# Patient Record
Sex: FEMALE | URBAN - METROPOLITAN AREA
[De-identification: names, ages, dates, MRNs, and addresses within clinical notes are randomized per-mention and may not be internally consistent; named-entity substitution may affect disease eponyms.]

---

## 2024-03-15 ENCOUNTER — OFFICE VISIT (OUTPATIENT)
Age: 79
End: 2024-03-15

## 2024-03-15 ENCOUNTER — HOSPITAL ENCOUNTER (OUTPATIENT)
Facility: HOSPITAL | Age: 79
Setting detail: SPECIMEN
Discharge: HOME | End: 2024-03-18

## 2024-03-15 VITALS
HEART RATE: 120 BPM | DIASTOLIC BLOOD PRESSURE: 69 MMHG | TEMPERATURE: 98 F | HEIGHT: 67 IN | BODY MASS INDEX: 26.53 KG/M2 | WEIGHT: 169 LBS | OXYGEN SATURATION: 95 % | RESPIRATION RATE: 16 BRPM | SYSTOLIC BLOOD PRESSURE: 111 MMHG

## 2024-03-15 DIAGNOSIS — R22.1 MASS OF LEFT SIDE OF NECK: Primary | ICD-10-CM

## 2024-03-15 DIAGNOSIS — F41.9 ANXIETY: ICD-10-CM

## 2024-03-15 RX ORDER — ALPRAZOLAM 0.25 MG/1
0.25 TABLET ORAL 3 TIMES DAILY PRN
Qty: 20 TABLET | Refills: 0 | Status: SHIPPED | OUTPATIENT
Start: 2024-03-15 | End: 2024-04-14

## 2024-03-15 RX ORDER — BLOOD SUGAR DIAGNOSTIC
STRIP MISCELLANEOUS
COMMUNITY
Start: 2024-03-11

## 2024-03-15 RX ORDER — M-VIT,TX,IRON,MINS/CALC/FOLIC 27MG-0.4MG
1 TABLET ORAL DAILY
COMMUNITY

## 2024-03-15 RX ORDER — ATORVASTATIN CALCIUM 10 MG/1
10 TABLET, FILM COATED ORAL DAILY
COMMUNITY
Start: 2024-01-14

## 2024-03-15 RX ORDER — LISINOPRIL 10 MG/1
10 TABLET ORAL DAILY
COMMUNITY
Start: 2024-01-14

## 2024-03-15 RX ORDER — LANCETS
EACH MISCELLANEOUS
COMMUNITY
Start: 2024-03-13

## 2024-03-15 RX ORDER — DULAGLUTIDE 3 MG/.5ML
3 INJECTION, SOLUTION SUBCUTANEOUS
COMMUNITY
Start: 2024-02-22

## 2024-03-15 RX ORDER — EMPAGLIFLOZIN 10 MG/1
10 TABLET, FILM COATED ORAL DAILY
COMMUNITY
Start: 2024-01-27

## 2024-03-15 ASSESSMENT — ENCOUNTER SYMPTOMS
SHORTNESS OF BREATH: 0
SORE THROAT: 0
EYE PAIN: 0
CONSTIPATION: 0
COUGH: 0
BACK PAIN: 0
VOMITING: 0
DIARRHEA: 0
NAUSEA: 0
ABDOMINAL PAIN: 0
BLOOD IN STOOL: 0
WHEEZING: 0
STRIDOR: 0

## 2024-03-15 NOTE — PROGRESS NOTES
[facility]  OFFICE PROCEDURE PROGRESS NOTE        Chart reviewed for the following:   Aylin PHAM MA, have reviewed the History, Physical and updated the Allergic reactions for Marleen Lorenzo, 1945     TIME OUT performed immediately prior to start of procedure:   Aylin PHAM MA, have performed the following reviews on Marleen Lorenzo prior to the start of the procedure:            * Patient was identified by name and date of birth   * Agreement on procedure being performed was verified  * Risks and Benefits explained to the patient  * Procedure site verified and marked as necessary  * Patient was positioned for comfort  * Consent was signed and verified     Time: 1550      Date of procedure: 3/15/2024    Procedure performed by:  Garret Retana MD    Provider assisted by: Aylin Rivera MA    Patient assisted by: self    How tolerated by patient: tolerated the procedure well with no complications    Post Procedural Pain Scale: 0 - No Hurt    Comments:  Specimen sent to pathology. Comfort and icepack provided. Aftercare instructions given. Patient expressed understanding.           
Identified pt with two pt identifiers (name and ). Reviewed chart in preparation for visit and have obtained necessary documentation.    Marleen Lorenzo is a 78 y.o. female  Chief Complaint   Patient presents with    Mass     Seen at the request of herself for evaluation of possible Lt neck mass     /69 (Site: Left Upper Arm, Position: Sitting)   Pulse (!) 120   Temp 98 °F (36.7 °C) (Oral)   Resp 16   Ht 1.689 m (5' 6.5\")   Wt 76.7 kg (169 lb)   SpO2 95%   BMI 26.87 kg/m²     1. Have you been to the ER, urgent care clinic since your last visit?  Hospitalized since your last visit?no    2. Have you seen or consulted any other health care providers outside of the [facility] since your last visit?  Include any pap smears or colon screening. no   
Limited Ultrasound of the left neck    Procedure:  Ultrasound of left neck  Indication:  left lateral neck mass  Surgeon:  Garret Retana MD FACS  Procedure:  Utilizing a [employer] S7 high frequency linear array transducer the left neck mass was scanned  Findings:  solid heterogeneous irregular left lateral neck mass 5 cm with adjacent lymph node  Impression:  suspicious BIRADS: 4  Recommend:  Biopsy for histology    Garret Retana MD FACS   
Procedure Note    Pre Procedure Diagnosis:  left neck mass  Post Procedure Diagnosis:  left neck mass  Procedure:  1.  Ultrasound guided spring loaded core biopsy of left neck mass and lymph node                        Surgeon:   Garret Retana MD FACS  Local 10 ml 1% lidocaine with epi  EBL minimal  SPECIMEN:   left neck mass and lymph node    Procedure:  After informed consent and time out,  the left neck was prepped with chlorhexidine.  Using ultrasound guidance, local anesthesia was injected into the dermis and subcutaneous tissues adjacent to the left neck mass  and node noted on ultrasound.  A small stab incision was made and using an 14 gauge Mammotome spring loaded core biopsy device and ultrasound guidance 4 cores were taken from a lateral to medial approach.  Two cores from the mass and two from an adjacent lymph node.  Pictures were taken to document this procedure.   Pressure was held and then steristrips and a sterile dressing was applied.  The patient tolerated the procedure well.      Signed  Garret Retana MD FACS   
  Musculoskeletal:         General: No swelling or tenderness. Normal range of motion.      Cervical back: Normal range of motion and neck supple.   Lymphadenopathy:      Cervical: No cervical adenopathy.      Right cervical: No superficial, deep or posterior cervical adenopathy.     Upper Body:      Right upper body: No supraclavicular or axillary adenopathy.      Left upper body: No supraclavicular or axillary adenopathy.      Lower Body: No right inguinal adenopathy. No left inguinal adenopathy.      Comments: Left lateral 5 cm firm mass associated with posterior aspect of SCM  ?lymph node vs muscular component   Skin:     Coloration: Skin is not jaundiced.      Findings: No erythema or rash.   Neurological:      Mental Status: She is alert and oriented to person, place, and time.      Cranial Nerves: No cranial nerve deficit.      Coordination: Coordination normal.      Gait: Gait normal.   Psychiatric:         Behavior: Behavior normal.         Thought Content: Thought content normal.         Judgment: Judgment normal.         Assessment / Plan:       Left lateral neck mass ? Etiology.  ?lymph node or other.   Firm  Extreme anxiety related to the mass.  Will give alprazolam while working up the process  NIDDM type 2.  On dual therapy  Hyperlipidemia.  On lipitor  Essential hypertension.  On rx.  BP actually low for her today      I did an US and subsequent US core biopsy of the mass today      Garret Retana MD FACS

## 2024-03-20 ENCOUNTER — TELEPHONE (OUTPATIENT)
Age: 79
End: 2024-03-20

## 2024-03-20 DIAGNOSIS — C79.89 METASTATIC SQUAMOUS CELL CARCINOMA TO HEAD AND NECK (HCC): Primary | ICD-10-CM

## 2024-03-20 NOTE — TELEPHONE ENCOUNTER
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 Positive and negative controls   stain appropriately.       Needs CT neck and chest  Needs ENT eval  Also will need med onc and likely rad onc      Spoke with patient and son-in-law (Amarjit Smith)  Will order CT neck and chest CT      Garret Retana MD FACS

## 2024-03-22 ENCOUNTER — TELEPHONE (OUTPATIENT)
Age: 79
End: 2024-03-22

## 2024-03-22 DIAGNOSIS — F41.9 ANXIETY: ICD-10-CM

## 2024-03-22 DIAGNOSIS — C79.89 METASTATIC SQUAMOUS CELL CARCINOMA TO HEAD AND NECK (HCC): Primary | ICD-10-CM

## 2024-03-22 RX ORDER — ALPRAZOLAM 0.25 MG/1
0.25 TABLET ORAL 3 TIMES DAILY PRN
Qty: 30 TABLET | Refills: 0 | Status: SHIPPED | OUTPATIENT
Start: 2024-03-22 | End: 2024-04-01

## 2024-03-22 NOTE — TELEPHONE ENCOUNTER
I wrote rx for additional #30.  Please let her know to try to limit them    Garret Retana MD FACS

## 2024-03-22 NOTE — TELEPHONE ENCOUNTER
Patient called said she needs a refill on Aloprazolam 0.25mg she takes three a day. Please advise call back number is [phone]. I asked the patient was Dr. Gonzalez the one that prescribe her the medication and she said yes.She said she needs this refill because she has a couple of test coming up and her Anxiety is so high she does not want to run out she has 5 tablets left.

## 2024-03-26 ENCOUNTER — TELEPHONE (OUTPATIENT)
Age: 79
End: 2024-03-26

## 2024-03-26 NOTE — TELEPHONE ENCOUNTER
Spoke with Dr. Retana regarding the situation, forwarding records per his request to Attn: [place] at [facility].

## 2024-03-26 NOTE — TELEPHONE ENCOUNTER
Pt insurance is out of network for the CT scan that's schedule. Scheduling advised the pt she may have [place].